# Patient Record
Sex: FEMALE | URBAN - METROPOLITAN AREA
[De-identification: names, ages, dates, MRNs, and addresses within clinical notes are randomized per-mention and may not be internally consistent; named-entity substitution may affect disease eponyms.]

---

## 2022-05-24 ENCOUNTER — OFFICE VISIT (OUTPATIENT)
Dept: URBAN - METROPOLITAN AREA CLINIC 72 | Facility: CLINIC | Age: 42
End: 2022-05-24
Payer: COMMERCIAL

## 2022-05-24 DIAGNOSIS — H52.03 HYPERMETROPIA, BILATERAL: Primary | ICD-10-CM

## 2022-05-24 DIAGNOSIS — H53.002 AMBLYOPIA OF LEFT EYE: ICD-10-CM

## 2022-05-24 PROCEDURE — 92002 INTRM OPH EXAM NEW PATIENT: CPT | Performed by: OPTOMETRIST

## 2022-05-24 ASSESSMENT — VISUAL ACUITY
OD: 20/20
OS: 20/50

## 2022-05-24 ASSESSMENT — INTRAOCULAR PRESSURE
OS: 12
OD: 13

## 2022-05-24 NOTE — IMPRESSION/PLAN
Impression: Amblyopia of left eye: H53.002. Plan: Discussed diagnosis in detail with patient. No treatment is required at this time. Reassured patient of current condition and treatment. Will continue to observe condition and or symptoms.